# Patient Record
Sex: FEMALE | Race: WHITE | Employment: UNEMPLOYED | URBAN - METROPOLITAN AREA
[De-identification: names, ages, dates, MRNs, and addresses within clinical notes are randomized per-mention and may not be internally consistent; named-entity substitution may affect disease eponyms.]

---

## 2018-04-18 LAB
ANTIBODY SCREEN, EXTERNAL: NEGATIVE
CHLAMYDIA, EXTERNAL: NEGATIVE
HBSAG, EXTERNAL: NEGATIVE
HIV, EXTERNAL: NON REACTIVE
N. GONORRHEA, EXTERNAL: NEGATIVE
RUBELLA, EXTERNAL: NORMAL
T. PALLIDUM, EXTERNAL: NEGATIVE
TYPE, ABO & RH, EXTERNAL: NORMAL

## 2018-10-29 LAB — GRBS, EXTERNAL: NEGATIVE

## 2018-11-08 ENCOUNTER — HOSPITAL ENCOUNTER (EMERGENCY)
Age: 28
Discharge: HOME | End: 2018-11-09
Attending: OBSTETRICS & GYNECOLOGY | Admitting: OBSTETRICS & GYNECOLOGY
Payer: MEDICAID

## 2018-11-09 VITALS
BODY MASS INDEX: 29.32 KG/M2 | DIASTOLIC BLOOD PRESSURE: 64 MMHG | TEMPERATURE: 97.7 F | WEIGHT: 176 LBS | SYSTOLIC BLOOD PRESSURE: 122 MMHG | HEART RATE: 85 BPM | RESPIRATION RATE: 14 BRPM | HEIGHT: 65 IN

## 2018-11-09 PROCEDURE — 99283 EMERGENCY DEPT VISIT LOW MDM: CPT

## 2018-11-09 NOTE — PROGRESS NOTES
Labor Progress Note 
 
34 y.o  at 45 wks presents to L&D with c/o of irregular mild UCs that started around 7pm. Was nervous because lives 40min away and was afraid would miss labor. Was 4cm dilated with first child at 36 wks and delivered after 40 wks. Was 4cm in office yesterday. Denies any DS. FOB at bedside. Physical Exam: 
Cervical Exam: 4/50/-2, posterior, soft Membranes:  Intact Uterine Activity: Frequency: none Fetal Heart Rate: Reactive, 140 moderate varibility Accelerations: yes Decelerations: none Assessment/Plan: 
IUP 38 wks Reassuring FWB Not in labor DC home Has f/u in office on tuesday Katie Muro CNM

## 2018-11-09 NOTE — DISCHARGE INSTRUCTIONS

## 2018-11-09 NOTE — PROGRESS NOTES
[address] CNM at bedside to view strip and talk to patient 
 
0100 Patient given discharge instructions and heading home

## 2018-11-15 ENCOUNTER — ANESTHESIA EVENT (OUTPATIENT)
Dept: LABOR AND DELIVERY | Age: 28
DRG: 560 | End: 2018-11-15
Payer: MEDICAID

## 2018-11-15 ENCOUNTER — ANESTHESIA (OUTPATIENT)
Dept: LABOR AND DELIVERY | Age: 28
DRG: 560 | End: 2018-11-15
Payer: MEDICAID

## 2018-11-15 ENCOUNTER — HOSPITAL ENCOUNTER (INPATIENT)
Age: 28
LOS: 2 days | Discharge: HOME | DRG: 560 | End: 2018-11-17
Attending: OBSTETRICS & GYNECOLOGY | Admitting: OBSTETRICS & GYNECOLOGY
Payer: MEDICAID

## 2018-11-15 LAB
ERYTHROCYTE [DISTWIDTH] IN BLOOD BY AUTOMATED COUNT: 13.6 % (ref 11.5–14.5)
HCT VFR BLD AUTO: 32.9 % (ref 35–47)
HGB BLD-MCNC: 10.4 G/DL (ref 11.5–16)
MCH RBC QN AUTO: 24.6 PG (ref 26–34)
MCHC RBC AUTO-ENTMCNC: 31.6 G/DL (ref 30–36.5)
MCV RBC AUTO: 77.8 FL (ref 80–99)
NRBC # BLD: 0 K/UL (ref 0–0.01)
NRBC BLD-RTO: 0 PER 100 WBC
PLATELET # BLD AUTO: 244 K/UL (ref 150–400)
PMV BLD AUTO: 8.9 FL (ref 8.9–12.9)
RBC # BLD AUTO: 4.23 M/UL (ref 3.8–5.2)
WBC # BLD AUTO: 9.6 K/UL (ref 3.6–11)

## 2018-11-15 PROCEDURE — 4A0HXCZ MEASUREMENT OF PRODUCTS OF CONCEPTION, CARDIAC RATE, EXTERNAL APPROACH: ICD-10-PCS | Performed by: OBSTETRICS & GYNECOLOGY

## 2018-11-15 PROCEDURE — 74011000250 HC RX REV CODE- 250

## 2018-11-15 PROCEDURE — 0HQ9XZZ REPAIR PERINEUM SKIN, EXTERNAL APPROACH: ICD-10-PCS | Performed by: OBSTETRICS & GYNECOLOGY

## 2018-11-15 PROCEDURE — 77030031139 HC SUT VCRL2 J&J -A

## 2018-11-15 PROCEDURE — 36415 COLL VENOUS BLD VENIPUNCTURE: CPT

## 2018-11-15 PROCEDURE — 00HU33Z INSERTION OF INFUSION DEVICE INTO SPINAL CANAL, PERCUTANEOUS APPROACH: ICD-10-PCS | Performed by: ANESTHESIOLOGY

## 2018-11-15 PROCEDURE — 3E0R3BZ INTRODUCTION OF ANESTHETIC AGENT INTO SPINAL CANAL, PERCUTANEOUS APPROACH: ICD-10-PCS | Performed by: ANESTHESIOLOGY

## 2018-11-15 PROCEDURE — 74011250637 HC RX REV CODE- 250/637: Performed by: OBSTETRICS & GYNECOLOGY

## 2018-11-15 PROCEDURE — 74011250636 HC RX REV CODE- 250/636

## 2018-11-15 PROCEDURE — 74011000250 HC RX REV CODE- 250: Performed by: ANESTHESIOLOGY

## 2018-11-15 PROCEDURE — 10907ZC DRAINAGE OF AMNIOTIC FLUID, THERAPEUTIC FROM PRODUCTS OF CONCEPTION, VIA NATURAL OR ARTIFICIAL OPENING: ICD-10-PCS | Performed by: OBSTETRICS & GYNECOLOGY

## 2018-11-15 PROCEDURE — 76060000078 HC EPIDURAL ANESTHESIA

## 2018-11-15 PROCEDURE — 75410000003 HC RECOV DEL/VAG/CSECN EA 0.5 HR

## 2018-11-15 PROCEDURE — 77030011943

## 2018-11-15 PROCEDURE — 74011250636 HC RX REV CODE- 250/636: Performed by: ANESTHESIOLOGY

## 2018-11-15 PROCEDURE — 85027 COMPLETE CBC AUTOMATED: CPT

## 2018-11-15 PROCEDURE — 75410000000 HC DELIVERY VAGINAL/SINGLE

## 2018-11-15 PROCEDURE — 75410000002 HC LABOR FEE PER 1 HR

## 2018-11-15 PROCEDURE — 65410000002 HC RM PRIVATE OB

## 2018-11-15 PROCEDURE — 74011250636 HC RX REV CODE- 250/636: Performed by: OBSTETRICS & GYNECOLOGY

## 2018-11-15 PROCEDURE — A4300 CATH IMPL VASC ACCESS PORTAL: HCPCS

## 2018-11-15 PROCEDURE — 99285 EMERGENCY DEPT VISIT HI MDM: CPT

## 2018-11-15 RX ORDER — ONDANSETRON 4 MG/1
4 TABLET, ORALLY DISINTEGRATING ORAL
Status: DISCONTINUED | OUTPATIENT
Start: 2018-11-15 | End: 2018-11-17 | Stop reason: HOSPADM

## 2018-11-15 RX ORDER — EPHEDRINE SULFATE 50 MG/ML
10 INJECTION, SOLUTION INTRAVENOUS ONCE
Status: ACTIVE | OUTPATIENT
Start: 2018-11-15 | End: 2018-11-16

## 2018-11-15 RX ORDER — MISOPROSTOL 200 UG/1
800 TABLET ORAL ONCE
Status: COMPLETED | OUTPATIENT
Start: 2018-11-15 | End: 2018-11-15

## 2018-11-15 RX ORDER — FENTANYL CITRATE 50 UG/ML
INJECTION, SOLUTION INTRAMUSCULAR; INTRAVENOUS AS NEEDED
Status: DISCONTINUED | OUTPATIENT
Start: 2018-11-15 | End: 2018-11-15 | Stop reason: HOSPADM

## 2018-11-15 RX ORDER — IBUPROFEN 400 MG/1
800 TABLET ORAL EVERY 8 HOURS
Status: DISCONTINUED | OUTPATIENT
Start: 2018-11-15 | End: 2018-11-17 | Stop reason: HOSPADM

## 2018-11-15 RX ORDER — BUPIVACAINE HYDROCHLORIDE 2.5 MG/ML
75 INJECTION, SOLUTION EPIDURAL; INFILTRATION; INTRACAUDAL ONCE
Status: DISPENSED | OUTPATIENT
Start: 2018-11-15 | End: 2018-11-16

## 2018-11-15 RX ORDER — SODIUM CHLORIDE 0.9 % (FLUSH) 0.9 %
5-10 SYRINGE (ML) INJECTION EVERY 8 HOURS
Status: DISCONTINUED | OUTPATIENT
Start: 2018-11-15 | End: 2018-11-17 | Stop reason: HOSPADM

## 2018-11-15 RX ORDER — OXYTOCIN/0.9 % SODIUM CHLORIDE 30/500 ML
0-25 PLASTIC BAG, INJECTION (ML) INTRAVENOUS
Status: DISCONTINUED | OUTPATIENT
Start: 2018-11-15 | End: 2018-11-17 | Stop reason: HOSPADM

## 2018-11-15 RX ORDER — AMMONIA 15 % (W/V)
1 AMPUL (EA) INHALATION AS NEEDED
Status: DISCONTINUED | OUTPATIENT
Start: 2018-11-15 | End: 2018-11-17 | Stop reason: HOSPADM

## 2018-11-15 RX ORDER — OXYTOCIN/0.9 % SODIUM CHLORIDE 30/500 ML
PLASTIC BAG, INJECTION (ML) INTRAVENOUS
Status: COMPLETED
Start: 2018-11-15 | End: 2018-11-15

## 2018-11-15 RX ORDER — OXYCODONE AND ACETAMINOPHEN 5; 325 MG/1; MG/1
1 TABLET ORAL
Status: DISCONTINUED | OUTPATIENT
Start: 2018-11-15 | End: 2018-11-17 | Stop reason: HOSPADM

## 2018-11-15 RX ORDER — NALOXONE HYDROCHLORIDE 0.4 MG/ML
0.4 INJECTION, SOLUTION INTRAMUSCULAR; INTRAVENOUS; SUBCUTANEOUS AS NEEDED
Status: DISCONTINUED | OUTPATIENT
Start: 2018-11-15 | End: 2018-11-15 | Stop reason: HOSPADM

## 2018-11-15 RX ORDER — OXYTOCIN/RINGER'S LACTATE 20/1000 ML
125 PLASTIC BAG, INJECTION (ML) INTRAVENOUS AS NEEDED
Status: DISCONTINUED | OUTPATIENT
Start: 2018-11-15 | End: 2018-11-17 | Stop reason: HOSPADM

## 2018-11-15 RX ORDER — SODIUM CHLORIDE 0.9 % (FLUSH) 0.9 %
5-10 SYRINGE (ML) INJECTION AS NEEDED
Status: DISCONTINUED | OUTPATIENT
Start: 2018-11-15 | End: 2018-11-17 | Stop reason: HOSPADM

## 2018-11-15 RX ORDER — HYDROCORTISONE 1 %
CREAM (GRAM) TOPICAL AS NEEDED
Status: DISCONTINUED | OUTPATIENT
Start: 2018-11-15 | End: 2018-11-17 | Stop reason: HOSPADM

## 2018-11-15 RX ORDER — EPHEDRINE SULFATE 50 MG/ML
INJECTION, SOLUTION INTRAVENOUS
Status: DISPENSED
Start: 2018-11-15 | End: 2018-11-16

## 2018-11-15 RX ORDER — FENTANYL/BUPIVACAINE/NS/PF 2-1250MCG
1-16 PREFILLED PUMP RESERVOIR EPIDURAL CONTINUOUS
Status: DISCONTINUED | OUTPATIENT
Start: 2018-11-15 | End: 2018-11-16 | Stop reason: HOSPADM

## 2018-11-15 RX ORDER — MISOPROSTOL 200 UG/1
TABLET ORAL
Status: DISPENSED
Start: 2018-11-15 | End: 2018-11-16

## 2018-11-15 RX ORDER — FENTANYL CITRATE 50 UG/ML
100 INJECTION, SOLUTION INTRAMUSCULAR; INTRAVENOUS ONCE
Status: DISCONTINUED | OUTPATIENT
Start: 2018-11-15 | End: 2018-11-15 | Stop reason: HOSPADM

## 2018-11-15 RX ORDER — SODIUM CHLORIDE, SODIUM LACTATE, POTASSIUM CHLORIDE, CALCIUM CHLORIDE 600; 310; 30; 20 MG/100ML; MG/100ML; MG/100ML; MG/100ML
125 INJECTION, SOLUTION INTRAVENOUS CONTINUOUS
Status: DISCONTINUED | OUTPATIENT
Start: 2018-11-15 | End: 2018-11-17 | Stop reason: HOSPADM

## 2018-11-15 RX ORDER — DOCUSATE SODIUM 100 MG/1
100 CAPSULE, LIQUID FILLED ORAL
Status: DISCONTINUED | OUTPATIENT
Start: 2018-11-15 | End: 2018-11-17 | Stop reason: HOSPADM

## 2018-11-15 RX ORDER — OXYCODONE AND ACETAMINOPHEN 5; 325 MG/1; MG/1
2 TABLET ORAL
Status: DISCONTINUED | OUTPATIENT
Start: 2018-11-15 | End: 2018-11-17 | Stop reason: HOSPADM

## 2018-11-15 RX ORDER — DIPHENHYDRAMINE HCL 25 MG
25 CAPSULE ORAL
Status: DISCONTINUED | OUTPATIENT
Start: 2018-11-15 | End: 2018-11-17 | Stop reason: HOSPADM

## 2018-11-15 RX ORDER — HYDROCORTISONE ACETATE PRAMOXINE HCL 2.5; 1 G/100G; G/100G
CREAM TOPICAL AS NEEDED
Status: DISCONTINUED | OUTPATIENT
Start: 2018-11-15 | End: 2018-11-17 | Stop reason: HOSPADM

## 2018-11-15 RX ORDER — LIDOCAINE HYDROCHLORIDE AND EPINEPHRINE 15; 5 MG/ML; UG/ML
4.5 INJECTION, SOLUTION EPIDURAL AS NEEDED
Status: DISCONTINUED | OUTPATIENT
Start: 2018-11-15 | End: 2018-11-15 | Stop reason: HOSPADM

## 2018-11-15 RX ORDER — SIMETHICONE 80 MG
80 TABLET,CHEWABLE ORAL
Status: DISCONTINUED | OUTPATIENT
Start: 2018-11-15 | End: 2018-11-17 | Stop reason: HOSPADM

## 2018-11-15 RX ORDER — BUPIVACAINE HYDROCHLORIDE 2.5 MG/ML
INJECTION, SOLUTION EPIDURAL; INFILTRATION; INTRACAUDAL AS NEEDED
Status: DISCONTINUED | OUTPATIENT
Start: 2018-11-15 | End: 2018-11-15 | Stop reason: HOSPADM

## 2018-11-15 RX ORDER — BUPIVACAINE HYDROCHLORIDE 2.5 MG/ML
INJECTION, SOLUTION EPIDURAL; INFILTRATION; INTRACAUDAL
Status: DISPENSED
Start: 2018-11-15 | End: 2018-11-16

## 2018-11-15 RX ADMIN — BUPIVACAINE HYDROCHLORIDE 3 ML: 2.5 INJECTION, SOLUTION EPIDURAL; INFILTRATION; INTRACAUDAL at 12:51

## 2018-11-15 RX ADMIN — Medication 10 ML/HR: at 12:54

## 2018-11-15 RX ADMIN — IBUPROFEN 800 MG: 400 TABLET ORAL at 17:07

## 2018-11-15 RX ADMIN — OXYTOCIN-SODIUM CHLORIDE 0.9% IV SOLN 30 UNIT/500ML 2 MILLI-UNITS/MIN: 30-0.9/5 SOLUTION at 10:44

## 2018-11-15 RX ADMIN — SODIUM CHLORIDE, SODIUM LACTATE, POTASSIUM CHLORIDE, AND CALCIUM CHLORIDE 125 ML/HR: 600; 310; 30; 20 INJECTION, SOLUTION INTRAVENOUS at 10:31

## 2018-11-15 RX ADMIN — LIDOCAINE HYDROCHLORIDE,EPINEPHRINE BITARTRATE 5 ML: 15; .005 INJECTION, SOLUTION EPIDURAL; INFILTRATION; INTRACAUDAL; PERINEURAL at 12:37

## 2018-11-15 RX ADMIN — SODIUM CHLORIDE, SODIUM LACTATE, POTASSIUM CHLORIDE, AND CALCIUM CHLORIDE 125 ML/HR: 600; 310; 30; 20 INJECTION, SOLUTION INTRAVENOUS at 12:54

## 2018-11-15 RX ADMIN — BUPIVACAINE HYDROCHLORIDE 2 ML: 2.5 INJECTION, SOLUTION EPIDURAL; INFILTRATION; INTRACAUDAL at 12:46

## 2018-11-15 RX ADMIN — MISOPROSTOL 800 MCG: 200 TABLET ORAL at 17:23

## 2018-11-15 RX ADMIN — FENTANYL CITRATE 100 MCG: 50 INJECTION, SOLUTION INTRAMUSCULAR; INTRAVENOUS at 12:42

## 2018-11-15 NOTE — H&P
History & Physical 
 
Name: Gely Govea MRN: [id number]  SSN: [id number] YOB: 1990  Age: 34 y.o. Sex: female Subjective:  
 
Reason for Admission:  Pregnancy and Contractions History of Present Illness: Gely Govea is a 34 y.o.  female with an estimated gestational age of 38w7d with Estimated Date of Delivery: 18. Patient complains of moderate contractions for 1 days. Pregnancy has been complicated by Painful contractions. Patient denies chest pain, fever, headache , nausea and vomiting, pelvic pressure, right upper quadrant pain  , shortness of breath, swelling, vaginal bleeding , vaginal leaking of fluid  and visual disturbances. OB History  Para Term  AB Living 2 1       1 SAB TAB Ectopic Molar Multiple Live Births Past Medical History:  
Diagnosis Date  Kidney disease   
 chronic UTI's with first pregnancy No past surgical history on file. Social History Occupational History  Not on file Tobacco Use  Smoking status: Never Smoker  Smokeless tobacco: Never Used Substance and Sexual Activity  Alcohol use: No  
  Frequency: Never  Drug use: No  
 Sexual activity: Yes  
  Partners: Male Birth control/protection: None No family history on file. No Known Allergies Prior to Admission medications Medication Sig Start Date End Date Taking? Authorizing Provider PNV No12-Iron-FA-DSS-OM-3 29 mg iron-1 mg -50 mg CPKD Take  by mouth. Yes Provider, Historical  
  
 
Review of Systems Objective:  
 
Vitals:   
Vitals:  
 11/15/18 0248 11/15/18 0250 BP: 128/68 Pulse: 83 Resp: 16 Temp: 97.5 °F (36.4 °C) Weight:  80.6 kg (177 lb 9.6 oz) Height:  5' 5\" (1.651 m) Temp (24hrs), Av.5 °F (36.4 °C), Min:97.5 °F (36.4 °C), Max:97.5 °F (36.4 °C) BP  Min: 128/68  Max: 128/68 Physical Exam 
 
Cervical Exam: 4 cm dilated 70% effaced   
-3 station Presenting Part: cephalic Cervical Position: mid position Consistency: Soft Uterine Activity: irregular Membranes: Intact Fetal Heart Rate: Baseline: 130 per minute Variability: moderate Accelerations: yes Decelerations: none Uterine contractions: irregular, every 5-10 minutes Labs: No results found for this or any previous visit (from the past 24 hour(s)). There is no problem list on file for this patient. Assessment and Plan:  
 
Latent labor Admitted for expectant management Ambulating Will Recheck cervix Signed By:  Cecelia Aly MD   
 November 15, 2018   
  
 
 
 
 
 
catarino

## 2018-11-15 NOTE — PROGRESS NOTES
0244 - Pt arrived c/o contractions every 4 minutes that began around 2200. Pt denies any leaking of fluid or vaginal bleeding and reports active fetal movement. 2075 - Dr. Sandra Barth notified of pt arrival, contractions every 4 minutes, reactive FHR tracing, and SVE. Pt would like to ambulate. Orders received for pt to ambulate and repeat SVE in 2 hours. 8510 - SVE 4-5. Pt states contractions are feeling stronger. 0250 - Dr. Sandra Barth at bedside. No change in SVE. Pt states contractions are painful and would like to ambulate and see if labor progresses. 3320 - Bedside and Verbal shift change report given to CHRIS Dawn RN (oncoming nurse) by Danette Toth RN (offgoing nurse). Report included the following information SBAR.

## 2018-11-15 NOTE — PROGRESS NOTES
11/15/18 1437 Cervical Exam  
Dilation (cm) 6 Eff 80 % Station -2 Position Mid Cervical Consistency Soft Baby Position Vertex Presentation Cephalic Membrane Status AROM  
s/p epidural 
Early active phase labor AROM clear Reassuring fetal surve GBS neg

## 2018-11-15 NOTE — PROGRESS NOTES
1040 Bedside shift change report given to BERT Hoff RN (oncoming nurse) by Rick Coronado. López RN (offgoing nurse). Report included the following information SBAR, Intake/Output, MAR and Recent Results. [id number] Patient requesting epidural. LR bolus started [address] Dr Lori Ni notified patient ready for epidural 
 
1240 Dr Tania Galindo at bedside for epidural 
 
[id number] Dr Brooke Silverio at bedside and reviewed fhr strip. MD aware difficult to  ctxs when patient laying on side. SVE 6/80/-2 and AROM clear fluid 1430 Dr Tania Galindo notified patient feeling pain in vaginal area and MD on way to redose 56 Dr Tania Galindo at bedside redosing  Notified Dr Brooke Silverio patient 10cm and ann tart pushing 1534 Bedside shift change report given to Galo Branham RN (oncoming nurse) by BERT Hoff RN (offgoing nurse). Report included the following information SBAR, Intake/Output, MAR and Recent Results.

## 2018-11-15 NOTE — L&D DELIVERY NOTE
Delivery Summary    Patient: Ammon Luna MRN: [id number]  SSN: [id number]    YOB: 1990  Age: 34 y.o. Sex: female       Information for the patient's :  [place] [[id number]]       Labor Events:    Labor: No   Rupture Date: 11/15/2018   Rupture Time: 2:28 PM   Rupture Type AROM   Amniotic Fluid Volume:      Amniotic Fluid Description: Clear     Induction: None       Augmentation: Oxytocin   Labor Events:       Cervical Ripening:     None     Delivery Events:  Episiotomy: None   Laceration(s): First degree perineal     Repaired: Yes    Number of Repair Packets: 1   Suture Type and Size: Rapide 3-0     Estimated Blood Loss (ml): 100ml       Delivery Date: 11/15/2018    Delivery Time: 3:45 PM  Delivery Type: Vaginal, Spontaneous  Sex:  Female     Gestational Age: 38w7d   Delivery Clinician:  Deo Gray  Living Status: Living   Delivery Location: L&D            APGARS  One minute Five minutes Ten minutes   Skin color: 1   1        Heart rate: 2   2        Grimace: 2   2        Muscle tone: 2   2        Breathin   2        Totals: 9   9            Presentation: Vertex    Position:        Resuscitation Method:  Suctioning-bulb     Meconium Stained: None      Cord Information: 3 Vessels  Complications: None  Cord around:    Delayed cord clamping? Yes  Cord clamped date/time:11/15/2018  3:49 PM  Disposition of Cord Blood:      Blood Gases Sent?: No    Placenta:  Date/Time: 11/15/2018  3:49 PM  Removal: Spontaneous      Appearance: Intact     [place] Measurements:  Birth Weight:        Birth Length:        Head Circumference:        Chest Circumference:       Abdominal Girth:       Other Providers:   Sourav HOOVER PEGGY;NATHAN SHANKAR;BENJA SERNA, Obstetrician;Primary Nurse;Primary [place] Nurse;Staff Nurse           Group B Strep:   Lab Results   Component Value Date/Time    GrBStrep, External negative 10/29/2018     Information for the patient's newbornJohanne Million [[id number]]   No results found for: ABORH, PCTABR, PCTDIG, BILI, ABORHEXT, ABORH    No results for input(s): PCO2CB, PO2CB, HCO3I, SO2I, IBD, PTEMPI, SPECTI, PHICB, ISITE, IDEV, IALLEN in the last 72 hours.

## 2018-11-15 NOTE — PROGRESS NOTES
1543 - Bedside and Verbal shift change report given to FLORY Sanz (oncoming nurse) by Joy Jacobo RN (offgoing nurse). Report included the following information SBAR, Kardex, Procedure Summary, Intake/Output, MAR, Recent Results and Med Rec Status. 1804 - TRANSFER - OUT REPORT: 
 
Verbal report given to WALDO Dover RN(name) on [facility]  being transferred to [facility] rm 339(unit) for routine progression of care Report consisted of patients Situation, Background, Assessment and  
Recommendations(SBAR). Information from the following report(s) SBAR, Kardex, Procedure Summary, Intake/Output, MAR, Recent Results and Med Rec Status was reviewed with the receiving nurse. Lines:  
Peripheral IV 11/15/18 Right Arm (Active) Site Assessment Clean, dry, & intact 11/15/2018 10:31 AM  
Phlebitis Assessment 0 11/15/2018 10:31 AM  
Infiltration Assessment 0 11/15/2018 10:31 AM  
Dressing Status Clean, dry, & intact 11/15/2018 10:31 AM  
Dressing Type Transparent;Tape 11/15/2018 10:31 AM  
Hub Color/Line Status Pink 11/15/2018 10:31 AM  
Action Taken Blood drawn 11/15/2018 10:31 AM  
  
 
Opportunity for questions and clarification was provided. Patient transported with: 
 Registered Nurse 
 
 
8130 - Epidural discontinued with tip intact.

## 2018-11-15 NOTE — PROGRESS NOTES
0750 Bedside and Verbal shift change report given to Miriam RN (oncoming nurse) by Hussein Echevarria RN (offgoing nurse). Report included the following information SBAR, Kardex, Intake/Output, MAR, Accordion, Recent Results and Med Rec Status. 0715 Dr Ezequiel Jacob at bedside. SVE 5/70/-3. Admission orders received. Plan to start Pitocin.   
 
[facility] report given to [employer]

## 2018-11-16 PROCEDURE — 65410000002 HC RM PRIVATE OB

## 2018-11-16 PROCEDURE — 74011250637 HC RX REV CODE- 250/637: Performed by: OBSTETRICS & GYNECOLOGY

## 2018-11-16 RX ADMIN — DOCUSATE SODIUM 100 MG: 100 CAPSULE, LIQUID FILLED ORAL at 09:34

## 2018-11-16 RX ADMIN — IBUPROFEN 800 MG: 400 TABLET ORAL at 21:27

## 2018-11-16 RX ADMIN — IBUPROFEN 800 MG: 400 TABLET ORAL at 09:24

## 2018-11-16 NOTE — ROUTINE PROCESS
Bedside shift change report given to Abraham Kumar RN (oncoming nurse) by Perico Ford RN (offgoing nurse). Report included the following information SBAR, Kardex, Intake/Output, MAR, Accordion and Recent Results.

## 2018-11-16 NOTE — PROGRESS NOTES
Post-Partum Day Number 1 Progress Note Cheryl Polanco Assessment: Doing well, post partum day 1 Plan: 1. Continue routine postpartum and perineal care as well as maternal education. Information for the patient's :  Edin Bustos [[id number]] Vaginal, Spontaneous Patient doing well without significant complaint. Voiding without difficulty, normal lochia. Vitals: 
Visit Vitals /69 (BP 1 Location: Left arm, BP Patient Position: At rest) Pulse 99 Temp 98.3 °F (36.8 °C) Resp 16 Ht 5' 5\" (1.651 m) Wt 80.6 kg (177 lb 9.6 oz) SpO2 99% Breastfeeding? Unknown BMI 29.55 kg/m² Temp (24hrs), Av.4 °F (36.9 °C), Min:98 °F (36.7 °C), Max:99 °F (37.2 °C) Exam:   Patient without distress. Abdomen soft, fundus firm, nontender Perineum with normal lochia noted. Lower extremities are negative for swelling, cords or tenderness. Labs:  
 
Lab Results Component Value Date/Time WBC 9.6 11/15/2018 10:34 AM  
 HGB 10.4 (L) 11/15/2018 10:34 AM  
 HCT 32.9 (L) 11/15/2018 10:34 AM  
 PLATELET 750  10:34 AM  
 
 
Recent Results (from the past 24 hour(s)) CBC W/O DIFF Collection Time: 11/15/18 10:34 AM  
Result Value Ref Range WBC 9.6 3.6 - 11.0 K/uL  
 RBC 4.23 3.80 - 5.20 M/uL  
 HGB 10.4 (L) 11.5 - 16.0 g/dL HCT 32.9 (L) 35.0 - 47.0 % MCV 77.8 (L) 80.0 - 99.0 FL  
 MCH 24.6 (L) 26.0 - 34.0 PG  
 MCHC 31.6 30.0 - 36.5 g/dL  
 RDW 13.6 11.5 - 14.5 % PLATELET 680 086 - 313 K/uL MPV 8.9 8.9 - 12.9 FL  
 NRBC 0.0 0  WBC ABSOLUTE NRBC 0.00 0.00 - 0.01 K/uL

## 2018-11-16 NOTE — ROUTINE PROCESS
TRANSFER - IN REPORT: 
 
Verbal report received from BEKAH Dale RN(name) on [facility]  being received from L&D(unit) for routine progression of care Report consisted of patients Situation, Background, Assessment and  
Recommendations(SBAR). Information from the following report(s) SBAR was reviewed with the receiving nurse. Opportunity for questions and clarification was provided. Assessment completed upon patients arrival to unit and care assumed.

## 2018-11-16 NOTE — LACTATION NOTE
This note was copied from a baby's chart. Initial Lactation Consultation: Infant born yesterday afternoon to a  mom at 45 6/7 weeks gestation. Mom nursed her first child with good supply until the infant started sleeping through the night and her supply decreased. I discussed with mom the importance of consistent nursing sessions to stimulate milk production. She agrees to feed infant 8-12 times per day, in response to feeding cues.

## 2018-11-17 VITALS
BODY MASS INDEX: 29.59 KG/M2 | HEIGHT: 65 IN | RESPIRATION RATE: 16 BRPM | DIASTOLIC BLOOD PRESSURE: 76 MMHG | TEMPERATURE: 97.5 F | SYSTOLIC BLOOD PRESSURE: 125 MMHG | WEIGHT: 177.6 LBS | HEART RATE: 91 BPM | OXYGEN SATURATION: 97 %

## 2018-11-17 NOTE — DISCHARGE SUMMARY
Obstetrical Discharge Summary     Name: Yanelis Carlisle MRN: [id number]  SSN: [id number]    YOB: 1990  Age: 34 y.o. Sex: female      Admit Date: 11/15/2018    Discharge Date: 2018     Admitting Physician: Sarah Pollock MD     Attending Physician:  Juanita Herman MD     Admission Diagnoses: MATERNITY  Labor abnormal    Discharge Diagnoses:   Information for the patient's :  Georjean Lesches [[id number]]   Delivery of a 2.84 kg female infant via Vaginal, Spontaneous on 11/15/2018 at 3:45 PM  by . Apgars were 9 and 9. Additional Diagnoses:   Hospital Problems  Never Reviewed          Codes Class Noted POA    Labor abnormal ICD-10-CM: O62.9  ICD-9-CM: 661.90  11/15/2018 Unknown             Lab Results   Component Value Date/Time    Rubella, External immune 2018    GrBStrep, External negative 10/29/2018       Hospital Course: Normal hospital course following the delivery. Disposition at Discharge: Home or self care    Discharged Condition: Stable    Patient Instructions:   Current Discharge Medication List      CONTINUE these medications which have NOT CHANGED    Details   PNV No12-Iron-FA-DSS-OM-3 29 mg iron-1 mg -50 mg CPKD Take  by mouth. Reference my discharge instructions.     Follow-up Appointments   Procedures    FOLLOW UP VISIT Appointment in: 6 Weeks At [facility]     At [facility]     Standing Status:   Standing     Number of Occurrences:   1     Order Specific Question:   Appointment in     Answer:   6 Weeks        Signed By:  Gurpreet Schneider MD     2018

## 2018-11-17 NOTE — ROUTINE PROCESS
Bedside and Verbal shift change report given to BONNIE Brito RN (oncoming nurse) by Vivienne Mcmanus. Ibis Dinero RN (offgoing nurse). Report included the following information SBAR, Kardex, OR Summary, Intake/Output, MAR and Recent Results. 1150 All teaching completed and DC inst given to pt. Pt verbalize understanding and deny c/o or questions. DC'd to home  via wheelchair to car.

## 2018-11-17 NOTE — PROGRESS NOTES
Bedside shift change report given to ABHISHEK Ma (oncoming nurse) by Vishnu Cantor RN (offgoing nurse). Report included the following information SBAR.

## 2018-11-17 NOTE — DISCHARGE INSTRUCTIONS
POSTPARTUM DISCHARGE INSTRUCTIONS       Name:  Josh Ferrara  YOB: 1990  Admission Diagnosis:  MATERNITY  Labor abnormal     Discharge Diagnosis:    Problem List as of 11/17/2018 Never Reviewed          Codes Class Noted - Resolved    Labor abnormal ICD-10-CM: O62.9  ICD-9-CM: 661.90  11/15/2018 - Present            Attending Physician:  Dave Moreno MD    Delivery Type:  Vaginal Childbirth: What To Expect At Home    Your Recovery: Your body will slowly heal in the next few weeks. It is easy to get too tired and overwhelmed during the first weeks after your baby is born. Changes in your hormones can shift your mood without warning. You may find it hard to meet the extra demands on your energy and time. Take it easy on yourself. Follow-up care is a key part of your treatment and safety. Be sure to make and go to all appointments, and call your doctor if you are having problems. It's also a good idea to know your test results and keep a list of the medicines you take. How can you care for yourself at home? Vaginal bleeding and cramps  · After delivery, you will have a bloody discharge from the vagina. This will turn pink within a week and then white or yellow after about 10 days. It may last for 2 to 4 weeks or longer, until the uterus has healed. Use pads instead of tampons until you stop bleeding. · Do not worry if you pass some blood clots, as long as they are smaller than a golf ball. If you have a tear or stitches in your vaginal area, change the pad at least every 4 hours to prevent soreness and infection. · You may have cramps for the first few days after childbirth. These are normal and occur as the uterus shrinks to normal size. Take an over-the-counter pain medicine, such as acetaminophen (Tylenol), ibuprofen (Advil, Motrin), or naproxen (Aleve), for cramps. Read and follow all instructions on the label. Do not take aspirin, because it can cause more bleeding.   Do not take acetaminophen (Tylenol) and other acetaminophen containing medications (i.e. Percocet) at the same time. Breast fullness  · Your breasts may overfill (engorge) in the first few days after delivery. To help milk flow and to relieve pain, warm your breasts in the shower or by using warm, moist towels before nursing. · If you are not nursing, do not put warmth on your breasts or touch your breasts. Wear a tight bra or sports bra and use ice until the fullness goes away. This usually takes 2 to 3 days. · Put ice or a cold pack on your breast after nursing to reduce swelling and pain. Put a thin cloth between the ice and your skin. Activity  · Eat a balanced diet. Do not try to lose weight by cutting calories. Keep taking your prenatal vitamins, or take a multivitamin. · Get as much rest as you can. Try to take naps when your baby sleeps during the day. · Get some exercise every day. But do not do any heavy exercise until your doctor says it is okay. · Wait until you are healed (about 4 to 6 weeks) before you have sexual intercourse. Your doctor will tell you when it is okay to have sex. · Talk to your doctor about birth control. You can get pregnant even before your period returns. Also, you can get pregnant while you are breast-feeding. Mental Health  · Many women get the \"baby blues\" during the first few days after childbirth. You may lose sleep, feel irritable, and cry easily. You may feel happy one minute and sad the next. Hormone changes are one cause of these emotional changes. Also, the demands of a new baby, along with visits from relatives or other family needs, add to a mother's stress. The \"baby blues\" often peak around the fourth day. Then they ease up in less than 2 weeks. · If your moodiness or anxiety lasts for more than 2 weeks, or if you feel like life is not worth living, you may have postpartum depression. This is different for each mother.  Some mothers with serious depression may worry intensely about their infant's well-being. Others may feel distant from their child. Some mothers might even feel that they might harm their baby. A mother may have signs of paranoia, wondering if someone is watching her. · With all the changes in your life, you may not know if you are depressed. Pregnancy sometimes causes changes in how you feel that are similar to the symptoms of depression. · Symptoms of depression include:  · Feeling sad or hopeless and losing interest in daily activities. These are the most common symptoms of depression. · Sleeping too much or not enough. · Feeling tired. You may feel as if you have no energy. · Eating too much or too little. · POSTPARTUM SUPPORT INTERNATIONAL (PSI) offers a Warm line; Chat with the Expert phone sessions; Information and Articles about Pregnancy and Postpartum Mood Disorders; Comprehensive List of Free Support Groups; Knowledgeable local coordinators who will offer support, information, and resources; Guide to Resources on [employer]; Calendar of events in the  mood disorders community; Latest News and Research; and [facility] [address] for [employer]. Remember - You are not alone; You are not to blame; With help, you will be well. 4-036-532-PPD(9697). WWW. POSTPARTUM. NET   · Writing or talking about death, such as writing suicide notes or talking about guns, knives, or pills. Keep the numbers for these national suicide hotlines: 8-134-963-TALK (5-118.260.5398) and 7-091-UTENKJF (5-280.220.9985). If you or someone you know talks about suicide or feeling hopeless, get help right away. Constipation and Hemorrhoids  · Drink plenty of fluids, enough so that your urine is light yellow or clear like water. If you have kidney, heart, or liver disease and have to limit fluids, talk with your doctor before you increase the amount of fluids you drink. · Eat plenty of fiber each day.  Have a bran muffin or bran cereal for breakfast, and try eating a piece of fruit for a mid-afternoon snack. · For painful, itchy hemorrhoids, put ice or a cold pack on the area several times a day for 10 minutes at a time. Follow this by putting a warm compress on the area for another 10 to 20 minutes or by sitting in a shallow, warm bath. When should you call for help? Call 911 anytime you think you may need emergency care. For example, call if:  · You are thinking of hurting yourself, your baby, or anyone else. · You passed out (lost consciousness). · You have symptoms of a blood clot in your lung (called a pulmonary embolism). These may include:    · Sudden chest pain. · Trouble breathing. · Coughing up blood. Call your doctor now or seek immediate medical care if:  · You have severe vaginal bleeding. · You are soaking through a pad each hour for 2 or more hours. · Your vaginal bleeding seems to be getting heavier or is still bright red 4 days after delivery. · You are dizzy or lightheaded, or you feel like you may faint. · You are vomiting or cannot keep fluids down. · You have a fever. · You have new or more belly pain. · You pass tissue (not just blood). · Your vaginal discharge smells bad. · Your belly feels tender or full and hard. · Your breasts are continuously painful or red. · You feel sad, anxious, or hopeless for more than a few days. · You have sudden, severe pain in your belly. · You have symptoms of a blood clot in your leg (called a deep vein thrombosis),          such as:  · Pain in your calf, back of the knee, thigh, or groin. · Redness and swelling in your leg or groin. · You have symptoms of preeclampsia, such as:  · Sudden swelling of your face, hands, or feet. · New vision problems (such as dimness or blurring). · A severe headache. · Your blood pressure is higher than it should be or rises suddenly. · You have new nausea or vomiting.     Watch closely for changes in your health, and be sure to contact your doctor if you have any problems. Additional Information:  Postpartum Support    PARENTS:  Are you feeling sad or depressed? Is it difficult for you to enjoy yourself? Do you feel more irritable or tense? Do you feel anxious or panicky? Are you having difficulty bonding with your baby? Do you feel as if you are \"out of control\" or \"going crazy\"? Are you worried that you might hurt your baby or yourself? FAMILIES: Do you worry that something is wrong but don't know how to help? Do you think that your partner or spouse is having problems coping? Are you worried that it may never get better? While many women experience some mild mood change or \"the blues\" during or after the birth of a child, 1 in 9 women experience more significant symptoms of depression or anxiety. 1 in 10 Dads become depressed during the first year. Things you can do  Being a good parent includes taking care of yourself. If you take care of yourself, you will be able to take better care of your baby and your family. · Talk to a counselor or healthcare provider who has training in  mood and anxiety problems. · Learn as much as you can about pregnancy and postpartum depression and anxiety. · Get support from family and friends. Ask for help when you need it. · Join a support group in your area or online. · Keep active by walking, stretching or whatever form of exercise helps you to feel better. · Get enough rest and time for yourself. · Eat a healthy diet. · Don't give up! It may take more than one try to get the right help you need. These are general instructions for a healthy lifestyle:    No smoking/ No tobacco products/ Avoid exposure to second hand smoke    Surgeon General's Warning:  Quitting smoking now greatly reduces serious risk to your health.     Obesity, smoking, and sedentary lifestyle greatly increases your risk for illness    A healthy diet, regular physical exercise & weight monitoring are important for maintaining a healthy lifestyle    Recognize signs and symptoms of STROKE:    F-face looks uneven    A-arms unable to move or move unevenly    S-speech slurred or non-existent    T-time-call 911 as soon as signs and symptoms begin - DO NOT go       back to bed or wait to see if you get better - TIME IS BRAIN. I have had the opportunity to make my options or choices for discharge. I have received and understand these instructions.

## 2019-11-21 LAB
CHLAMYDIA, EXTERNAL: NEGATIVE
HBSAG, EXTERNAL: NEGATIVE
HIV, EXTERNAL: NON REACTIVE
N. GONORRHEA, EXTERNAL: NEGATIVE
RUBELLA, EXTERNAL: NORMAL
T. PALLIDUM, EXTERNAL: NON REACTIVE

## 2020-02-15 ENCOUNTER — APPOINTMENT (OUTPATIENT)
Dept: ULTRASOUND IMAGING | Age: 30
End: 2020-02-15
Attending: PHYSICIAN ASSISTANT
Payer: COMMERCIAL

## 2020-02-15 ENCOUNTER — HOSPITAL ENCOUNTER (INPATIENT)
Age: 30
LOS: 3 days | Discharge: HOME | End: 2020-02-18
Attending: EMERGENCY MEDICINE | Admitting: OBSTETRICS & GYNECOLOGY
Payer: COMMERCIAL

## 2020-02-15 DIAGNOSIS — O03.9 MISCARRIAGE: Primary | ICD-10-CM

## 2020-02-15 PROBLEM — N93.9 VAGINAL BLEEDING: Status: ACTIVE | Noted: 2020-02-15

## 2020-02-15 LAB
ALBUMIN SERPL-MCNC: 3.5 G/DL (ref 3.5–5)
ALBUMIN/GLOB SERPL: 0.9 {RATIO} (ref 1.1–2.2)
ALP SERPL-CCNC: 68 U/L (ref 45–117)
ALT SERPL-CCNC: 17 U/L (ref 12–78)
ANION GAP SERPL CALC-SCNC: 5 MMOL/L (ref 5–15)
APPEARANCE UR: ABNORMAL
APTT PPP: 25.2 SEC (ref 22.1–32)
AST SERPL-CCNC: 18 U/L (ref 15–37)
BACTERIA URNS QL MICRO: NEGATIVE /HPF
BASOPHILS # BLD: 0 K/UL (ref 0–0.1)
BASOPHILS NFR BLD: 0 % (ref 0–1)
BILIRUB SERPL-MCNC: 0.3 MG/DL (ref 0.2–1)
BILIRUB UR QL CFM: ABNORMAL
BUN SERPL-MCNC: 8 MG/DL (ref 6–20)
BUN/CREAT SERPL: 16 (ref 12–20)
CALCIUM SERPL-MCNC: 8.6 MG/DL (ref 8.5–10.1)
CHLORIDE SERPL-SCNC: 110 MMOL/L (ref 97–108)
CO2 SERPL-SCNC: 23 MMOL/L (ref 21–32)
COLOR UR: ABNORMAL
CREAT SERPL-MCNC: 0.5 MG/DL (ref 0.55–1.02)
DIFFERENTIAL METHOD BLD: ABNORMAL
EOSINOPHIL # BLD: 0.1 K/UL (ref 0–0.4)
EOSINOPHIL NFR BLD: 1 % (ref 0–7)
EPITH CASTS URNS QL MICRO: ABNORMAL /LPF
ERYTHROCYTE [DISTWIDTH] IN BLOOD BY AUTOMATED COUNT: 13.6 % (ref 11.5–14.5)
GLOBULIN SER CALC-MCNC: 4 G/DL (ref 2–4)
GLUCOSE SERPL-MCNC: 86 MG/DL (ref 65–100)
GLUCOSE UR STRIP.AUTO-MCNC: NEGATIVE MG/DL
HCT VFR BLD AUTO: 36.5 % (ref 35–47)
HGB BLD-MCNC: 12 G/DL (ref 11.5–16)
HGB UR QL STRIP: ABNORMAL
IMM GRANULOCYTES # BLD AUTO: 0 K/UL (ref 0–0.04)
IMM GRANULOCYTES NFR BLD AUTO: 0 % (ref 0–0.5)
INR PPP: 1 (ref 0.9–1.1)
KETONES UR QL STRIP.AUTO: NEGATIVE MG/DL
LEUKOCYTE ESTERASE UR QL STRIP.AUTO: ABNORMAL
LYMPHOCYTES # BLD: 2.3 K/UL (ref 0.8–3.5)
LYMPHOCYTES NFR BLD: 26 % (ref 12–49)
MCH RBC QN AUTO: 26.1 PG (ref 26–34)
MCHC RBC AUTO-ENTMCNC: 32.9 G/DL (ref 30–36.5)
MCV RBC AUTO: 79.5 FL (ref 80–99)
MONOCYTES # BLD: 0.5 K/UL (ref 0–1)
MONOCYTES NFR BLD: 6 % (ref 5–13)
NEUTS SEG # BLD: 5.9 K/UL (ref 1.8–8)
NEUTS SEG NFR BLD: 67 % (ref 32–75)
NITRITE UR QL STRIP.AUTO: POSITIVE
NRBC # BLD: 0 K/UL (ref 0–0.01)
NRBC BLD-RTO: 0 PER 100 WBC
PH UR STRIP: 5 [PH] (ref 5–8)
PLATELET # BLD AUTO: 305 K/UL (ref 150–400)
PMV BLD AUTO: 9.3 FL (ref 8.9–12.9)
POTASSIUM SERPL-SCNC: 4.6 MMOL/L (ref 3.5–5.1)
PROT SERPL-MCNC: 7.5 G/DL (ref 6.4–8.2)
PROT UR STRIP-MCNC: 100 MG/DL
PROTHROMBIN TIME: 10.2 SEC (ref 9–11.1)
RBC # BLD AUTO: 4.59 M/UL (ref 3.8–5.2)
RBC #/AREA URNS HPF: >100 /HPF (ref 0–5)
SODIUM SERPL-SCNC: 138 MMOL/L (ref 136–145)
SP GR UR REFRACTOMETRY: 1.02
THERAPEUTIC RANGE,PTTT: NORMAL SECS (ref 58–77)
UR CULT HOLD, URHOLD: NORMAL
UROBILINOGEN UR QL STRIP.AUTO: 0.2 EU/DL (ref 0.2–1)
WBC # BLD AUTO: 8.9 K/UL (ref 3.6–11)
WBC URNS QL MICRO: ABNORMAL /HPF (ref 0–4)

## 2020-02-15 PROCEDURE — 81001 URINALYSIS AUTO W/SCOPE: CPT

## 2020-02-15 PROCEDURE — 74011250636 HC RX REV CODE- 250/636: Performed by: OBSTETRICS & GYNECOLOGY

## 2020-02-15 PROCEDURE — 74011250637 HC RX REV CODE- 250/637: Performed by: OBSTETRICS & GYNECOLOGY

## 2020-02-15 PROCEDURE — 65270000029 HC RM PRIVATE

## 2020-02-15 PROCEDURE — 86923 COMPATIBILITY TEST ELECTRIC: CPT

## 2020-02-15 PROCEDURE — 36415 COLL VENOUS BLD VENIPUNCTURE: CPT

## 2020-02-15 PROCEDURE — 85610 PROTHROMBIN TIME: CPT

## 2020-02-15 PROCEDURE — 99284 EMERGENCY DEPT VISIT MOD MDM: CPT

## 2020-02-15 PROCEDURE — 85730 THROMBOPLASTIN TIME PARTIAL: CPT

## 2020-02-15 PROCEDURE — 80053 COMPREHEN METABOLIC PANEL: CPT

## 2020-02-15 PROCEDURE — 76815 OB US LIMITED FETUS(S): CPT

## 2020-02-15 PROCEDURE — 85025 COMPLETE CBC W/AUTO DIFF WBC: CPT

## 2020-02-15 PROCEDURE — 86900 BLOOD TYPING SEROLOGIC ABO: CPT

## 2020-02-15 RX ORDER — MISOPROSTOL 200 UG/1
600 TABLET ORAL EVERY 4 HOURS
Status: DISCONTINUED | OUTPATIENT
Start: 2020-02-15 | End: 2020-02-16

## 2020-02-15 RX ORDER — SODIUM CHLORIDE, SODIUM LACTATE, POTASSIUM CHLORIDE, CALCIUM CHLORIDE 600; 310; 30; 20 MG/100ML; MG/100ML; MG/100ML; MG/100ML
125 INJECTION, SOLUTION INTRAVENOUS CONTINUOUS
Status: DISCONTINUED | OUTPATIENT
Start: 2020-02-15 | End: 2020-02-18 | Stop reason: HOSPADM

## 2020-02-15 RX ORDER — HYDROMORPHONE HYDROCHLORIDE 1 MG/ML
1 INJECTION, SOLUTION INTRAMUSCULAR; INTRAVENOUS; SUBCUTANEOUS
Status: DISCONTINUED | OUTPATIENT
Start: 2020-02-15 | End: 2020-02-18 | Stop reason: HOSPADM

## 2020-02-15 RX ORDER — ONDANSETRON 2 MG/ML
4 INJECTION INTRAMUSCULAR; INTRAVENOUS
Status: DISCONTINUED | OUTPATIENT
Start: 2020-02-15 | End: 2020-02-18 | Stop reason: HOSPADM

## 2020-02-15 RX ORDER — BUTORPHANOL TARTRATE 1 MG/ML
1 INJECTION INTRAMUSCULAR; INTRAVENOUS
Status: DISCONTINUED | OUTPATIENT
Start: 2020-02-15 | End: 2020-02-18 | Stop reason: HOSPADM

## 2020-02-15 RX ORDER — FENTANYL CITRATE 50 UG/ML
100 INJECTION, SOLUTION INTRAMUSCULAR; INTRAVENOUS
Status: DISCONTINUED | OUTPATIENT
Start: 2020-02-15 | End: 2020-02-15

## 2020-02-15 RX ADMIN — ONDANSETRON 4 MG: 2 INJECTION, SOLUTION INTRAMUSCULAR; INTRAVENOUS at 22:05

## 2020-02-15 RX ADMIN — MISOPROSTOL 600 MCG: 200 TABLET ORAL at 21:26

## 2020-02-15 RX ADMIN — SODIUM CHLORIDE, SODIUM LACTATE, POTASSIUM CHLORIDE, AND CALCIUM CHLORIDE 125 ML/HR: 600; 310; 30; 20 INJECTION, SOLUTION INTRAVENOUS at 22:09

## 2020-02-15 RX ADMIN — HYDROMORPHONE HYDROCHLORIDE 1 MG: 1 INJECTION, SOLUTION INTRAMUSCULAR; INTRAVENOUS; SUBCUTANEOUS at 22:05

## 2020-02-15 NOTE — ED TRIAGE NOTES
Patient reports abdominal pain and cramping for 4 days, this morning with \"burning\" reports soaking panty liners every 2 hours. Patient reports discharge is mucus and clots. Patient 19 weeks 3 days pregnant.

## 2020-02-15 NOTE — ED PROVIDER NOTES
34year old female  at 19w3d presenting for bleeding. Pt reports that she started with vaginal bleeding about 4 days ago. Estimates that she is changing her panty liner every 2 hours. + cramping. OB: Dr. Dexter Salgado. Last appt with OB was in December. No fever or vomiting. Pt notes that she tried to call her OB yesterday and the day before. Notes that the bleeding seemed to worsen some today. Notes that she had some bleeding at about 10 weeks as well. Blood Type B+. PMHx: denies  Social: non-smoker           Past Medical History:   Diagnosis Date    Kidney disease     chronic UTI's with first pregnancy       History reviewed. No pertinent surgical history. History reviewed. No pertinent family history.     Social History     Socioeconomic History    Marital status: SINGLE     Spouse name: Not on file    Number of children: Not on file    Years of education: Not on file    Highest education level: Not on file   Occupational History    Not on file   Social Needs    Financial resource strain: Not on file    Food insecurity:     Worry: Not on file     Inability: Not on file    Transportation needs:     Medical: Not on file     Non-medical: Not on file   Tobacco Use    Smoking status: Never Smoker    Smokeless tobacco: Never Used   Substance and Sexual Activity    Alcohol use: No     Frequency: Never    Drug use: No    Sexual activity: Yes     Partners: Male     Birth control/protection: None   Lifestyle    Physical activity:     Days per week: Not on file     Minutes per session: Not on file    Stress: Not on file   Relationships    Social connections:     Talks on phone: Not on file     Gets together: Not on file     Attends [employer] service: Not on file     Active member of club or organization: Not on file     Attends meetings of clubs or organizations: Not on file     Relationship status: Not on file    Intimate partner violence:     Fear of current or ex partner: Not on file Emotionally abused: Not on file     Physically abused: Not on file     Forced sexual activity: Not on file   Other Topics Concern     Service Not Asked    Blood Transfusions Not Asked    Caffeine Concern Not Asked    Occupational Exposure Not Asked    Hobby Hazards Not Asked    Sleep Concern Not Asked    Stress Concern Not Asked    Weight Concern Not Asked    Special Diet Not Asked    Back Care Not Asked    Exercise Not Asked    Bike Helmet Not Asked   [address] Not Asked    Self-Exams Not Asked   Social History Narrative    Not on file         ALLERGIES: Patient has no known allergies. Review of Systems   Constitutional: Negative for fever. HENT: Negative for congestion. Eyes: Negative for discharge and redness. Respiratory: Negative for cough and shortness of breath. Cardiovascular: Negative for chest pain. Gastrointestinal: Positive for abdominal pain. Negative for diarrhea and vomiting. Genitourinary: Positive for pelvic pain and vaginal bleeding. Negative for difficulty urinating. Musculoskeletal: Negative for joint swelling. Skin: Negative for wound. Neurological: Negative for syncope and headaches. Psychiatric/Behavioral: Negative for behavioral problems. All other systems reviewed and are negative. Vitals:    02/15/20 1838 02/15/20 1942 02/15/20 2114 02/15/20 2128   BP: 126/74 125/75 119/67    Pulse: 88 85 76    Resp: 18 17 18    Temp: 98.5 °F (36.9 °C) 98.2 °F (36.8 °C) 98.1 °F (36.7 °C)    SpO2: 99%      Weight:    72.6 kg (160 lb)   Height:    5' 5\" (1.651 m)            Physical Exam  Vitals signs and nursing note reviewed. Constitutional:       General: She is not in acute distress. Appearance: She is well-developed. Comments: Well-appearing female   HENT:      Head: Normocephalic and atraumatic. Right Ear: External ear normal.      Left Ear: External ear normal.   Eyes:      General: No scleral icterus.      Conjunctiva/sclera: Conjunctivae normal.   Neck:      Musculoskeletal: Neck supple. Trachea: No tracheal deviation. Cardiovascular:      Rate and Rhythm: Normal rate and regular rhythm. Heart sounds: Normal heart sounds. No murmur. No friction rub. No gallop. Pulmonary:      Effort: Pulmonary effort is normal. No respiratory distress. Breath sounds: Normal breath sounds. No stridor. No wheezing. Abdominal:      General: There is no distension. Palpations: Abdomen is soft. Musculoskeletal: Normal range of motion. Skin:     General: Skin is warm and dry. Neurological:      Mental Status: She is alert and oriented to person, place, and time. Psychiatric:         Behavior: Behavior normal.          MDM  Number of Diagnoses or Management Options  Miscarriage:   Diagnosis management comments: 27-year-old female at 23 weeks gestation presenting to the ED for cramping and bleeding. Ultrasound showed fetal demise at about 14 weeks, while in the ED patient states that she passed something large, was taken up to L&D to be evaluated by [facility] hospitalist.       Amount and/or Complexity of Data Reviewed  Tests in the radiology section of CPT®: ordered and reviewed  Discuss the patient with other providers: yes (Dr. Ivon Talbot ED attending. Dr. Deep Abraham, [facility] hospitalist)           Procedures             Discussed with Dr. Yovani Robles, OB. Discussed case and management. Recommends consulting OB hospitalist for in hospital evaluation. GRECIA Stein  8:06 PM    Discussed with Dr. Deep Abraham, [facility] hospitalist.  Would like CBC, PT, PTT sent, will call back. Discussed with RN that pt needs to be moved to a private room.   Diamond Stein  8:12 PM

## 2020-02-16 LAB
ABO + RH BLD: NORMAL
BLD PROD TYP BPU: NORMAL
BLD PROD TYP BPU: NORMAL
BLOOD GROUP ANTIBODIES SERPL: NORMAL
BPU ID: NORMAL
BPU ID: NORMAL
CROSSMATCH RESULT,%XM: NORMAL
CROSSMATCH RESULT,%XM: NORMAL
SPECIMEN EXP DATE BLD: NORMAL
STATUS OF UNIT,%ST: NORMAL
STATUS OF UNIT,%ST: NORMAL
UNIT DIVISION, %UDIV: 0
UNIT DIVISION, %UDIV: 0

## 2020-02-16 PROCEDURE — 75410000000 HC DELIVERY VAGINAL/SINGLE

## 2020-02-16 PROCEDURE — 74011250637 HC RX REV CODE- 250/637: Performed by: OBSTETRICS & GYNECOLOGY

## 2020-02-16 PROCEDURE — 74011250636 HC RX REV CODE- 250/636: Performed by: OBSTETRICS & GYNECOLOGY

## 2020-02-16 PROCEDURE — 3E0P7VZ INTRODUCTION OF HORMONE INTO FEMALE REPRODUCTIVE, VIA NATURAL OR ARTIFICIAL OPENING: ICD-10-PCS | Performed by: OBSTETRICS & GYNECOLOGY

## 2020-02-16 PROCEDURE — 65270000029 HC RM PRIVATE

## 2020-02-16 RX ORDER — MISOPROSTOL 200 UG/1
600 TABLET ORAL EVERY 4 HOURS
Status: DISCONTINUED | OUTPATIENT
Start: 2020-02-16 | End: 2020-02-17

## 2020-02-16 RX ADMIN — MISOPROSTOL 600 MCG: 200 TABLET ORAL at 01:54

## 2020-02-16 RX ADMIN — SODIUM CHLORIDE, SODIUM LACTATE, POTASSIUM CHLORIDE, AND CALCIUM CHLORIDE 125 ML/HR: 600; 310; 30; 20 INJECTION, SOLUTION INTRAVENOUS at 06:25

## 2020-02-16 RX ADMIN — SODIUM CHLORIDE, SODIUM LACTATE, POTASSIUM CHLORIDE, AND CALCIUM CHLORIDE 125 ML/HR: 600; 310; 30; 20 INJECTION, SOLUTION INTRAVENOUS at 22:08

## 2020-02-16 RX ADMIN — MISOPROSTOL 600 MCG: 200 TABLET ORAL at 06:24

## 2020-02-16 RX ADMIN — MISOPROSTOL 600 MCG: 200 TABLET ORAL at 22:06

## 2020-02-16 RX ADMIN — SODIUM CHLORIDE, SODIUM LACTATE, POTASSIUM CHLORIDE, AND CALCIUM CHLORIDE 125 ML/HR: 600; 310; 30; 20 INJECTION, SOLUTION INTRAVENOUS at 14:14

## 2020-02-16 RX ADMIN — MISOPROSTOL 600 MCG: 200 TABLET ORAL at 10:20

## 2020-02-16 RX ADMIN — MISOPROSTOL 600 MCG: 200 TABLET ORAL at 14:08

## 2020-02-16 RX ADMIN — MISOPROSTOL 600 MCG: 200 TABLET ORAL at 18:35

## 2020-02-16 NOTE — PROGRESS NOTES
0730  Verbal shift change report given to BEKAH Gonzalez RN (oncoming nurse) by CORTES Seaman RN (offgoing nurse). Report included the following information SBAR, Kardex, MAR and Recent Results. Pt is sleeping,  at bedside. 0945  Pt up to bathroom with diarrhea and cramps, states pain 2/10, declines pain medication at this time. 1010  Dr. Abe Holley at bedside to talk with pt and discuss plan for care. 1130  Tolerated breakfast, cramp pain 2/10, declines wanting any pain medications. [address] [facility] visiting with family, cramping rating pain 2/10 declines pain meds. 1830  Pt tolerated regular diet well, pt states no pain or bleeding at this time.

## 2020-02-16 NOTE — PROGRESS NOTES
Labor Progress Note  I assumed care of Ms. Roxanna Miller this morning. She unfortunately has a demise at 24 weeks. By ultrasound estimate it was around 14 weeks when it occurred. She's receiving cytotec 600 mcg q4. Is about to get 4th dose. Right now she feels some mild cramps. This was obviously unexpected and she is appropriately shocked, saddened, and has questions. Her  is with her but he stepped out to get a phone . No data found. Physical Exam:  Resting comfortably in bed    Assessment/Plan:  Fetal demise. Continue cytotec. Pain medication as needed. I spent some time answering her questions and providing reassurance including how we may not know why this happened, this is not her fault, etc. I explained that it is hard to predict how long this may take. It could be 24-48 hours. All questions answered at this time.

## 2020-02-16 NOTE — PROGRESS NOTES
Spiritual Care Assessment/Progress Note  [facility]      NAME: Marcy Rain      MRN: [id number]  AGE: 34 y.o. SEX: female  Temple Affiliation: Macario Baum   Language: English     2/15/2020     Total Time (in minutes): 20     Spiritual Assessment begun in [facility] [place] [address] [id number] [facility] through conversation with:         []Patient        [] Family    [] Friend(s)        Reason for Consult: Death,  loss     Spiritual beliefs: (Please include comment if needed)     [] Identifies with a william tradition:         [] Supported by a william community:            [] Claims no spiritual orientation:           [] Seeking spiritual identity:                [] Adheres to an individual form of spirituality:           [x] Not able to assess:                           Identified resources for coping:      [] Prayer                               [] Music                  [] Guided Imagery     [] Family/friends                 [] Pet visits     [] Devotional reading                         [] Unknown     [] Other:                                              Interventions offered during this visit: (See comments for more details)    Patient Interventions: Initial visit           Plan of Care:     [] Support spiritual and/or cultural needs    [] Support AMD and/or advance care planning process      [x] Support grieving process   [] Coordinate Rites and/or Rituals    [] Coordination with community clergy   [] No spiritual needs identified at this time   [] Detailed Plan of Care below (See Comments)  [] Make referral to Music Therapy  [] Make referral to Pet Therapy     [] Make referral to Addiction services  [] Make referral to [facility]  [] Make referral to Spiritual Care Partner  [] No future visits requested        [x] Follow up visits as needed     Responded to fetal demise. Consulted with Gonsalo Pickard RN who indicated that the pt declined a  visit at this time.  Advised her of ongoing  support as needed.   Chaplain Medina MDiv, MS, [facility]  [phone])

## 2020-02-16 NOTE — H&P
Labor and Delivery Admission Note  2/15/2020     CC Vaginal Bleeding     34 y.o., , female, G3 P  presented to the ER with c/o vaginal bleeding. States has had mucus like discharge and mild bleeding for the last 4 days . But today her bleeding has increased accompanied with mild pelvic cramps. States has had some bleeding at 11 weeks with spontaneous resolution. PNC with Dr. Lidia Shepard.  x 2 ( 4 yr and 5 yr old)    Currently denies any CP, shortness of breath, dizziness. + moderate vaginal bleeding noted. PNC: Blood type: B            RH: pos            Rubella:             SVII serology: neg             GBS status:   Past Medical History:   Diagnosis Date    Kidney disease     chronic UTI's with first pregnancy     History reviewed. No pertinent surgical history. OB/GYN: Dr. Kamille Brand:   Current Facility-Administered Medications   Medication Dose Route Frequency    lactated Ringers infusion  125 mL/hr IntraVENous CONTINUOUS    miSOPROStol (CYTOTEC) tablet 600 mcg  600 mcg Buccal Q4H    hydromorphone (PF) (DILAUDID) syringe 1 mg  1 mg IntraVENous Q3H PRN    fentaNYL citrate (PF) injection 100 mcg  100 mcg IntraVENous Q1H PRN    ondansetron (ZOFRAN) injection 4 mg  4 mg IntraVENous Q4H PRN     Allergies: No Known Allergies  Pertinent ROS: per HPI   History reviewed. No pertinent family history. Social History     Socioeconomic History    Marital status: SINGLE     Spouse name: Not on file    Number of children: Not on file    Years of education: Not on file    Highest education level: Not on file   Occupational History    Not on file   Social Needs    Financial resource strain: Not on file    Food insecurity:     Worry: Not on file     Inability: Not on file    Transportation needs:     Medical: Not on file     Non-medical: Not on file   Tobacco Use    Smoking status: Never Smoker    Smokeless tobacco: Never Used   Substance and Sexual Activity    Alcohol use:  No Frequency: Never    Drug use: No    Sexual activity: Yes     Partners: Male     Birth control/protection: None   Lifestyle    Physical activity:     Days per week: Not on file     Minutes per session: Not on file    Stress: Not on file   Relationships    Social connections:     Talks on phone: Not on file     Gets together: Not on file     Attends [employer] service: Not on file     Active member of club or organization: Not on file     Attends meetings of clubs or organizations: Not on file     Relationship status: Not on file    Intimate partner violence:     Fear of current or ex partner: Not on file     Emotionally abused: Not on file     Physically abused: Not on file     Forced sexual activity: Not on file   Other Topics Concern     Service Not Asked    Blood Transfusions Not Asked    Caffeine Concern Not Asked    Occupational Exposure Not Asked   Hermila Priestly Hazards Not Asked    Sleep Concern Not Asked    Stress Concern Not Asked    Weight Concern Not Asked    Special Diet Not Asked    Back Care Not Asked    Exercise Not Asked    Bike Helmet Not Asked    [address] Not Asked    Self-Exams Not Asked   Social History Narrative    Not on file       OBJECTIVE:  Gravid , female NAD  Temp (24hrs), Av.4 °F (36.9 °C), Min:98.2 °F (36.8 °C), Max:98.5 °F (36.9 °C)    Visit Vitals  /75   Pulse 85   Temp 98.2 °F (36.8 °C)   Resp 17   SpO2 99%       Labs:    Lab Results   Component Value Date/Time    WBC 9.6 11/15/2018 10:34 AM       Ultrasound   History: Pregnant and cramping.     Ultrasonography of the pelvis was performed transabdominally. The uterus  measures 11.1 x 6.7 x 9.0 cm. The left ovary measures 2.6 x 2.2 x 2.1 cm. The  right ovary measures 2.6 x 2.3 x 1.9 cm. There is normal ovarian blood flow. There is fluid within the cervix which is dilated. There is a single  intrauterine gestation.  Femur length measures 1.3 cm corresponding to mean  menstrual age of 14 weeks 2 days. The remaining measurements could not be  obtained as there is no significant amniotic fluid. A heartbeat could not be  identified. The placenta is difficult to evaluate due to oligohydramnios.     IMPRESSION  IMPRESSION: Oligohydramnios with fetal demise        Exam:  HEENT:  normal   Lungs:  clear  Cor:  RRR  Abdomen: soft, nontender  Cervix:  FT/+moderate bleeding with clots  Ext- No edema /No calf tenderness      Impression:     with 14 week fetal demise and Vaginal bleeding   Ms. Cooper is tearful at this time. .Condolences were given. Discussed at length about causes Early second trimeter fetal loss. Discussed Medical management with Buccal  Misoprostol 600mcg every 3 hrs . Complications like hemorrhage ,Retained Placenta , infection requiring D & C, Blood transfusion  was also discussed.   IV LR @ 125 cc/hr  CBC, Coags, CMP  Type and crossmatch 2 Units PRBC    Lacy Ulloa MD

## 2020-02-16 NOTE — PROGRESS NOTES
2115- Pt reports vaginal bleeding x 4 days worsening today and low abdomen cramping radiating into her back. Pt brought to labor and delivery from the ER.    2155- Pt ambulatory to bathroom. Kitty Pad  Changed. Large amount of bleeding    2205- Pt reports painful contractions, pain medication given as ordered. 2345- Kitty pad changed, large amount of bleeding    0045- Pt sleeping. 0405- Pt ambulatory to restroom, passed a baseball sized clot with bright red bleeding. peripad changed moderate amount of bleeding on pad. Dr Ladan Orozco notified. 0630- peripad changed, small amount of bleeding noted on pad.

## 2020-02-17 PROCEDURE — 74011250637 HC RX REV CODE- 250/637: Performed by: OBSTETRICS & GYNECOLOGY

## 2020-02-17 PROCEDURE — 65270000029 HC RM PRIVATE

## 2020-02-17 PROCEDURE — 74011250636 HC RX REV CODE- 250/636: Performed by: OBSTETRICS & GYNECOLOGY

## 2020-02-17 RX ORDER — MISOPROSTOL 200 UG/1
600 TABLET ORAL EVERY 6 HOURS
Status: DISCONTINUED | OUTPATIENT
Start: 2020-02-17 | End: 2020-02-18 | Stop reason: HOSPADM

## 2020-02-17 RX ADMIN — MISOPROSTOL 600 MCG: 200 TABLET ORAL at 16:27

## 2020-02-17 RX ADMIN — MISOPROSTOL 600 MCG: 200 TABLET ORAL at 02:06

## 2020-02-17 RX ADMIN — MISOPROSTOL 600 MCG: 200 TABLET ORAL at 06:15

## 2020-02-17 RX ADMIN — MISOPROSTOL 600 MCG: 200 TABLET ORAL at 10:08

## 2020-02-17 RX ADMIN — SODIUM CHLORIDE, SODIUM LACTATE, POTASSIUM CHLORIDE, AND CALCIUM CHLORIDE 125 ML/HR: 600; 310; 30; 20 INJECTION, SOLUTION INTRAVENOUS at 06:17

## 2020-02-17 NOTE — PROGRESS NOTES
Spiritual Care Assessment/Progress Note  [facility]      NAME: Maverick Tenorio      MRN: [id number]  AGE: 34 y.o. SEX: female  Mosque Affiliation: Stefani Mejia   Language: English     2020     Total Time (in minutes): 10     Spiritual Assessment begun in [facility] 3 LABOR & DELIVERY through conversation with:         [x]Patient        [x] Family    [] Friend(s)        Reason for Consult: Follow-up, routine, Death,  loss     Spiritual beliefs: (Please include comment if needed)     [x] Identifies with a william tradition:         [] Supported by a william community:            [] Claims no spiritual orientation:           [] Seeking spiritual identity:                [] Adheres to an individual form of spirituality:           [] Not able to assess:                           Identified resources for coping:      [x] Prayer                               [] Music                  [] Guided Imagery     [x] Family/friends                 [] Pet visits     [] Devotional reading                         [] Unknown     [] Other:                                               Interventions offered during this visit: (See comments for more details)    Patient Interventions: Affirmation of emotions/emotional suffering, Affirmation of william, Normalization of emotional/spiritual concerns, Prayer (assurance of)     Family/Friend(s):  Affirmation of emotions/emotional suffering, Affirmation of william     Plan of Care:     [x] Support spiritual and/or cultural needs    [] Support AMD and/or advance care planning process      [x] Support grieving process   [] Coordinate Rites and/or Rituals    [] Coordination with community clergy   [] No spiritual needs identified at this time   [] Detailed Plan of Care below (See Comments)  [] Make referral to Music Therapy  [] Make referral to Pet Therapy     [] Make referral to Addiction services  [] Make referral to [facility]  [] Make referral to Spiritual Care Partner  [] No future visits requested        [x] Follow up visits as needed     Comments:  visit for  loss.  checked in and pt was awake and spouse in chair at bedside. Let her know of  support and availbility. Please contact [phone] [facility] for further support.  follow up as needed.      Leonarda Ying, MACE   [phone])

## 2020-02-17 NOTE — PROGRESS NOTES
Labor Progress Note  Ms Kamran Deleon is now starting to fell more cramping. But not enough that she needs pain medication. No data found. Physical Exam:  Cervical Exam:  deferred      Assessment/Plan:  19 week fetal demise. Will switch to vaginal route for cytotec to see if that works better. She does not want a D&C unless she absolutely has to have one as she wants to be able to see the baby.

## 2020-02-17 NOTE — PROGRESS NOTES
1940-Bedside and Verbal shift change report given to BERT Rogers RN  (oncoming nurse) by [employer]. Destiny Montemayor RN  (offgoing nurse). Report included the following information SBAR, Kardex, Intake/Output, MAR, Accordion and Recent Results. 2000-Dr. Youngblood at bedside talking with client about POC.  2100-Client states she is still doing okay just some cramping. 2210-Dr. Youngblood at bedside to place intravaginal vaginal. Client states that she is doing okay declines the need for pain medication or anything to help her sleep. 2330-Client appears to be sleeping, no distress noted. 0100-Client appears to be sleeping no distress noted  0210-Dr. Youngblood at bedside for cytotec placement. Client states that there is no difference her discomfort   0300-CLient sleeping no distress noted  0400-Client sleeping no distress noted   0500-Client sleeping no distress noted   0615-Dr. Youngblood at bedside to place cytotec. Talked with client about POC for the day.

## 2020-02-17 NOTE — PROGRESS NOTES
Pt seen and examined. Still not feeling any contractions/cramping. No VB. VSS and afebrile. Cervix unchanged at 2/50. Discussed plan of care. Would recommend D&E at this time however unable to find a provider at [facility] to perform the procedure at this time. Discussed case with M Dr. Scooby Diego who does not perform D&E. He recommended talking to [facility] physician Dr. Brady Steel and having pt transfer to [facility] if needed. Discussed case with my partner Dr. Kashmir Shi who can perform procedure tomorrow at [facility]. Discussed with patient and her  possibility of transfer to another facility to have D&E done. Pt elected for another dose of cytotec as logistics are being worked out. 600 mcg of cytotec was placed in posterior fornix. Will continue to monitor.

## 2020-02-17 NOTE — ROUTINE PROCESS
6959: Bedside shift change report given to [employer] (oncoming nurse) by Hollie Awan RN (offgoing nurse). Report included the following information SBAR, Intake/Output, MAR and Recent Results. 0555: Dr Tiny Hobbs at bedside discussing [address]. 1005: Dr Tiny Hobbs at bedside discussing POC. SVE unchanged. Additional dose of vaginal Cytotec given. 1200: patient requesting to eat lunch, Dr Tiny Hobbs states patient does not need to be NPO at this time. Patient requesting to walk around hospital, IV saline locked. Instructed to return if feeling any differently, verbalized understanding. 1545: Dr Tiny Hobbs at bedside discussing surgery option at [facility] tomorrow. Patient in agreement. NPO at 0400. Patient would like to continue with Cytotec, order changed to 600mcg q6 per MD. 
 
1900: patient called out stating Cytotec fell out when up to bathroom. Pills noted in toilet, Dr Brisa Zapata notified.

## 2020-02-17 NOTE — PROGRESS NOTES
Assuming care of pt. She was seen and her case was discussed with offgoing team. Kenn Yanez is a pleasant 35 y/o  with fetal demise at 23 5/7 weeks. She was admitted 2/15 due to vaginal bleeding and found to have fetal demise with fetus measuring at 15 2/7 wks. She had 24 hours or buccal cytotec followed by vaginal cytotec (last dose 0600 this am). Cervix was 2/50/high at last check. She is not feeling any cramping. She is afebrile with stable vital signs. Patient highly desires vaginal delivery as she would like to see and hold baby. Discussed that given 2 days of cytotec, will need to consider D&E as alternate option. Will reassess at 10 am and make a decision at that time.

## 2020-02-17 NOTE — PROGRESS NOTES
NUTRITION       Malnutrition Screening Tool (MST) triggered RD referral based on results obtained during nursing admission assessment. The patient's chart was reviewed and nutrition assessment is not indicated at this time. Thank you.       Regina Blanton RD

## 2020-02-17 NOTE — PROGRESS NOTES
Labor Progress Note  Ms. Roberta Short has been sleeping all night. .  No data found. Physical Exam:  Cervical Exam:  2/50/high, 600 mcg of cytotec placed vaginally      Assessment/Plan:  No progress since last exam. I discussed with her that there is a point where we can't give more cytotec so will need to think about D&C. Will discuss with oncoming doc and MFM for options.

## 2020-02-17 NOTE — PROGRESS NOTES
Labor Progress Note  Ms. Juan Gaytan is resting comfortably in bed. No data found. Physical Exam:  Cervical Exam: 1/long/high, 600 mcg of cytotec placed vaginally without incident    Assessment/Plan:  Continue labor induction for 19 week fetal demise. Will now do cytotec vaginally going forward.

## 2020-02-17 NOTE — PROGRESS NOTES
Discussed case with Dr. Garcias who is able to perform D&E tomorrow at [facility] at 12:30. Discussed plan with patient and she is amenable. Plan to keep admitted here at [facility] overnight and will continue cytotec to ensure cervix stays dilated prior to procedure. If she delivers vaginally then we can cancel procedure however given >48 hours and no response to cytotec I don't anticipate change before then. Patient to be NPO starting at 0400 tomorrow. Will dc at 0800 and patient will drive to [facility] for scheduled procedure. Reviewed R/B of procedure and expected postoperative course. All questions were answered.

## 2020-02-17 NOTE — PROGRESS NOTES
Labor Progress Note  She's been sleeping since last dose. No data found. Physical Exam:  Cervical Exam:  2/long/high, 600 mcg cytotec placed vaginally without incident    Assessment/Plan:  Made a little progress. Continue with vaginal cytotec.

## 2020-02-18 ENCOUNTER — APPOINTMENT (OUTPATIENT)
Dept: ULTRASOUND IMAGING | Age: 30
End: 2020-02-18
Attending: OBSTETRICS & GYNECOLOGY
Payer: COMMERCIAL

## 2020-02-18 VITALS
OXYGEN SATURATION: 97 % | TEMPERATURE: 98.5 F | RESPIRATION RATE: 14 BRPM | SYSTOLIC BLOOD PRESSURE: 123 MMHG | HEART RATE: 77 BPM | BODY MASS INDEX: 26.66 KG/M2 | WEIGHT: 160 LBS | DIASTOLIC BLOOD PRESSURE: 86 MMHG | HEIGHT: 65 IN

## 2020-02-18 PROCEDURE — 76802 OB US < 14 WKS ADDL FETUS: CPT

## 2020-02-18 PROCEDURE — 76817 TRANSVAGINAL US OBSTETRIC: CPT

## 2020-02-18 NOTE — PROGRESS NOTES
Report received from Maritza Abdalla RN. Patient resting in bed comfortably with family present. No needs at this time. 2235: Dr. Rich Connelly at bedside discussing plan of care. 2253: Dr. Rich Connelly performing bedside ultrasound and speculum exam.    0005: Patient taken to ultrasound accompanied by RN.    0130: Dr. Rich Connelly and RN at bedside discussing ultrasound findings indicating no IUP present. MD answered patient questions, no needs at this time. Spiritual care offered, patient declined at this time. Bedside and Verbal shift change report given to Sindy Villa (oncoming nurse) by Althea Vo (offgoing nurse). Report included the following information SBAR, Kardex, Intake/Output and MAR.

## 2020-02-18 NOTE — DISCHARGE SUMMARY
Obstetrical Discharge Summary     Name: Lauryn Alexandra MRN: [id number]  SSN: [id number]    YOB: 1990  Age: 34 y.o. Sex: female      Admit Date: 2/15/2020    Discharge Date: 2020     Admitting Physician: Harrison Naik MD     Attending Physician:  Jacinto Story, Bell Coronado,*     Admission Diagnoses: Vaginal bleeding [N93.9]    Discharge Diagnoses:   Information for the patient's :  Brianne Echevarria [[id number]]   Delivery of a   child infant via Vaginal, Spontaneous on 2020 at 4:00 AM  by  . Apgars were 0  and 0 . Additional Diagnoses:   Hospital Problems  Never Reviewed          Codes Class Noted POA    Vaginal bleeding ICD-10-CM: N93.9  ICD-9-CM: 623.8  2/15/2020 Unknown             Lab Results   Component Value Date/Time    Rubella, External immune 2019    GrBStrep, External negative 10/29/2018       Immunization(s): There is no immunization history for the selected administration types on file for this patient. Hospital Course: Patient was admitted with a fetal demise @ 19+ wks, measuring only 14 weeks. She underwent a cytotec induction. Unfortunately, it appears she passed the fetus and placenta unrecognized and it was flushed down the toilet. She continued to receive cytotec. However, after several additional doses with no ongoing bleeding or cramping, Dr. Paris Granado performed a bedside ultrasound and did not identify a pregnancy. A radiology ultrasound was ordered and it confirmed no pregnancy or retained POC. The following morning pt was doing well and was deemed ready for discharge. Patient Instructions:   Current Discharge Medication List      CONTINUE these medications which have NOT CHANGED    Details   PNV No12-Iron-FA-DSS-OM-3 29 mg iron-1 mg -50 mg CPKD Take  by mouth. Please make followup appointment for 4-6 weeks  Pelvic rest for 6 weeks  Pain medication as prescribed. Use of contraception as discussed.     Condition: stable  Disposition: home    Follow-up Appointments   Procedures    FOLLOW UP VISIT Appointment in: Two Weeks Dr. Brett Lozoya     Standing Status:   Standing     Number of Occurrences:   1     Order Specific Question:   Appointment in     Answer:    Two Weeks        Signed By:  Dhara Arvizu MD     February 18, 2020

## 2020-02-18 NOTE — PROGRESS NOTES
Progress Note    Patient seen,  Patient Reports no pain, scant bleeding, has been walking all day. Denies fever, chills, n/v. She reports no significant pain or bleeding since approximately 4 AM (the morning after IOL started with Buccal Cytotec.) she reports that was when she had a large baseball sized clot and Bright red bleeding in toilet. (0042- 2/15/2020). Physical Exam:    Cervical Exam: 1-2 cm/50/-3  12 week sized uterus on Bimanual examination  Bedside US reveal  12-14 weeks sized uterus, thickened EMS no clear evidence of Fetal parts(will order formal US to evaluate for retained fetal parts vs completed  Procedure    Assessment/Plan:  HD #3 IUFD 23 wega (14 week by femur length admission)- unable to measure other fetal parts due to oligohydramnios, current Bedside US reveal no clear evidence of IUPor fetal parts(maybe due to Oligo vs completed procedure) will obtain formal US to ascertain which is true. Discussed US result with Patient: No evidence of IUP. Patient to have final disposition with [phone] in AM- But Likely D&E  Not needed at this time due to no US evidence of  Retained Pregnancy. Tez Gaona MD        US Reveal:    FINDINGS: The transpelvic images demonstrate a distended urinary bladder without  evidence of filling defect. The uterus measures 9.0 x 4.9 x 6.8 cm. Myometrial  echogenicity is normal. The endometrial stripe measures 17 mm by transpelvic  technique. It demonstrates no significant vascularity on color Doppler imaging. No gestational sac or fetal pole is seen within the endometrial canal. Over a 0. By overlying bowel gas. No pelvic mass or free fluid is seen.     Transvaginal images demonstrate a thickened, heterogeneous endometrial stripe  measuring 13 mm in thickness. No gestational sac or fetal pole is evident. No  myometrial mass is seen. The cervix has a normal appearance. The right ovary  measures 4.2 x 3.1 x 1.5 cm. The left ovary measures 4.2 x 4.2 x 3.0 cm. No  adnexal mass or pathological cyst is seen. There is no pelvic free fluid.      IMPRESSION  IMPRESSION:  Thickened endometrial stripe. No evidence of retained products of conception. Unremarkable ovaries.

## 2020-02-18 NOTE — PROGRESS NOTES
Patient seen, resting in bed. She is without complaints this AM.  Reports scant bleeding and no pain. She reports being ready for discharge. Patient seen a couple of hours later and is now sitting up in bed, dressed and ready for discharge. Still with no pain or bleeding. Declines going home with any meds. Reviewed following up with Dr. Shanda Lloyd in 2 weeks, avoiding intercourse until at least 3 weeks (but pending her evaluation with Dr. Shanda Lloyd), returning for excessive bleeding, fevers/chills, foul smelling discharge. She has received our hand out on grief. She has a very supportive  and Evangelical family.

## 2020-02-18 NOTE — DISCHARGE INSTRUCTIONS
Patient Education        Stillbirth and Infant Loss (Vaginal Delivery): Care Instructions  Overview    The loss of a baby is devastating and very hard to accept. You may wonder why it happened or blame yourself. But a loss can happen even in a pregnancy that had been going well. In the weeks to come, try to take care of your physical and emotional needs. Take care of yourself in whatever way feels best.  Follow-up care is a key part of your treatment and safety. Be sure to make and go to all appointments, and call your doctor if you are having problems. It's also a good idea to know your test results and keep a list of the medicines you take. How can you care for yourself at home? Taking care of your body  · Use pads instead of tampons for the bloody flow that may last as long as 2 weeks. · Ask your doctor if you can take an over-the-counter pain medicine, such as acetaminophen (Tylenol), ibuprofen (Advil, Motrin), or naproxen (Aleve), to ease cramps. Be safe with medicines. Read and follow all instructions on the label. · Ease soreness of hemorrhoids and the area between your vagina and rectum with ice compresses or witch hazel pads. · Ease constipation by drinking lots of fluid and eating high-fiber foods. Ask your doctor about over-the-counter stool softeners. · Cleanse yourself with a gentle squeeze of warm water from a bottle instead of wiping with toilet paper. · Take a sitz bath in warm water several times a day. · Talk to your doctor about how to ease discomfort from your milk coming in. It can take days to a few weeks for your milk to dry up. · Ask your doctor about when it is okay to have sex. Many doctors recommend waiting about 4 to 6 weeks. This gives your body time to heal.  · Some women want to try to get pregnant again. Your doctor can tell you when it is safe. Dealing with your grief  · Rest whenever you can. Being tired makes it harder to handle your emotions.   · Tell your family and friends what they can do. You may want to spend time alone, or you may seek the comfort of family and friends. · Try to eat healthy foods, get some sleep, and get exercise (or just get out of the house) to help you feel strong as you heal.  · Talk to your doctor about how you are coping. He or she will want to watch you for signs of depression. You may want to have counseling for support and to help you express your feelings. · Think about making a memory book of your pregnancy and baby. Many parents want to take pictures and keep a lock of hair. The hospital may take photos or footprints for you. · If you can, try to talk to others who have gone through this loss. You can make connections online or in person. Here are some organizations that can help:  ? The [employer]: Go to www.compassionatefriends. org for this resource for people who have lost a child. The group can help put you in touch with one of its support groups in your area. ? Share (Pregnancy and Infant Loss [address] [place] [address].): This group at www.nationalshare. org can offer advice and connections to others who have lost a child. ? The [employer] Still[employer]: This group at www.stillbirthalliance. org offers information and resources. When should you call for help? Call 911 anytime you think you may need emergency care.  For example, call if:    · You have thoughts of harming yourself or another person.     · You passed out (lost consciousness).     · You have chest pain, are short of breath, or cough up blood.     · You have a seizure.     Call your doctor now or seek immediate medical care if:    · You have severe vaginal bleeding.     · You have a fever.     · You have new or more pain in your belly or pelvis.     · You are dizzy or lightheaded, or you feel like you may faint.     · You have signs of a blood clot in your leg (called a deep vein thrombosis), such as:  ? Pain in your calf, back of the knee, thigh, or groin.  ? Redness and swelling in your leg.     · You have signs of preeclampsia, such as:  ? Sudden swelling of your face, hands, or feet. ? New vision problems (such as dimness, blurring, or seeing spots). ? A severe headache.    Watch closely for changes in your health, and be sure to contact your doctor if:    · Your vaginal bleeding seems to be getting heavier.     · You have new or worse vaginal discharge.     · You feel so sad, anxious, or hopeless that you aren't able to manage daily activities.     · You do not get better as expected. Where can you learn more? Go to http://joan-ijeoma.info/. Enter N353 in the search box to learn more about \"Stillbirth and Infant Loss (Vaginal Delivery): Care Instructions. \"  Current as of: May 29, 2019  Content Version: 12.2  © 4794-0706 [employer]. Care instructions adapted under license by [employer] (which disclaims liability or warranty for this information). If you have questions about a medical condition or this instruction, always ask your healthcare professional. [address] any warranty or liability for your use of this information.

## 2023-04-28 NOTE — PROGRESS NOTES
Patient was very sleepy - eyes glazed. Reports pain 5-6. Explained making her very sleepy and needs to go home. Asked if would take anything po. (Reported requested no script to go home) Reports can take vicodin and would like a script for discharge. Dr Mina Che called to request script. Reports nausea so zofran given and IV dilaudid. Reports will need pain at a 4/10 to go home. Post-Partum Day Number 2 Progress Note Gely Govea Assessment: Doing well, post partum day 2 Plan: 1. Discharge home today 2. Follow up in office in 6 weeks with Mik Cedeno MD 
3. Post partum activity advised, diet as tolerated 4. Discharge Medications: ibuprofen, and medications prior to admission Information for the patient's :  Trista Zuñiga [[id number]] Vaginal, Spontaneous Patient doing well without significant complaint. Voiding without difficulty, normal lochia. Vitals: 
Visit Vitals /76 (BP 1 Location: Right arm, BP Patient Position: At rest) Pulse 69 Temp 97.9 °F (36.6 °C) Resp 16 Ht 5' 5\" (1.651 m) Wt 80.6 kg (177 lb 9.6 oz) SpO2 97% Breastfeeding? Unknown BMI 29.55 kg/m² Temp (24hrs), Av.5 °F (36.9 °C), Min:97.9 °F (36.6 °C), Max:99.3 °F (37.4 °C) Exam:    
    Patient without distress. Abdomen soft, fundus firm, nontender Lower extremities are negative for swelling, cords or tenderness. Labs:  
 
Lab Results Component Value Date/Time WBC 9.6 11/15/2018 10:34 AM  
 HGB 10.4 (L) 11/15/2018 10:34 AM  
 HCT 32.9 (L) 11/15/2018 10:34 AM  
 PLATELET 536  10:34 AM  
 
 
No results found for this or any previous visit (from the past 24 hour(s)).

## 2024-09-01 NOTE — PROGRESS NOTES
0714-Bedside report from [facility], pt asleep, woke to explain shift change, no complaints  0900-Pt asleep  1005-Pt woke, ready to go home, no complaints  1025-MD in room, pt verbalized understanding of d/c instructions  1035-pt d/c home with  nonweight-bearing